# Patient Record
Sex: MALE | Race: WHITE | Employment: FULL TIME | URBAN - METROPOLITAN AREA
[De-identification: names, ages, dates, MRNs, and addresses within clinical notes are randomized per-mention and may not be internally consistent; named-entity substitution may affect disease eponyms.]

---

## 2017-10-09 ENCOUNTER — HOSPITAL ENCOUNTER (OUTPATIENT)
Dept: CT IMAGING | Age: 52
Discharge: HOME | End: 2017-10-09
Attending: INTERNAL MEDICINE | Admitting: INTERNAL MEDICINE

## 2017-10-09 DIAGNOSIS — R10.9 ACUTE ABDOMINAL PAIN: ICD-10-CM

## 2022-01-17 ENCOUNTER — ANESTHESIA EVENT (OUTPATIENT)
Dept: ENDOSCOPY | Age: 57
End: 2022-01-17
Payer: COMMERCIAL

## 2022-01-17 ENCOUNTER — ANESTHESIA (OUTPATIENT)
Dept: ENDOSCOPY | Age: 57
End: 2022-01-17
Payer: COMMERCIAL

## 2022-01-17 ENCOUNTER — HOSPITAL ENCOUNTER (OUTPATIENT)
Age: 57
Setting detail: OUTPATIENT SURGERY
Discharge: HOME | End: 2022-01-17
Attending: INTERNAL MEDICINE | Admitting: INTERNAL MEDICINE
Payer: COMMERCIAL

## 2022-01-17 VITALS — SYSTOLIC BLOOD PRESSURE: 129 MMHG | OXYGEN SATURATION: 96 % | DIASTOLIC BLOOD PRESSURE: 66 MMHG

## 2022-01-17 VITALS
OXYGEN SATURATION: 98 % | TEMPERATURE: 97.8 F | SYSTOLIC BLOOD PRESSURE: 131 MMHG | HEIGHT: 73 IN | RESPIRATION RATE: 20 BRPM | WEIGHT: 315 LBS | DIASTOLIC BLOOD PRESSURE: 80 MMHG | BODY MASS INDEX: 41.75 KG/M2 | HEART RATE: 67 BPM

## 2022-01-17 PROBLEM — R19.5 POSITIVE COLORECTAL CANCER SCREENING USING COLOGUARD TEST: Status: ACTIVE | Noted: 2022-01-17

## 2022-01-17 LAB
GLUCOSE BLD-MCNC: 112 MG/DL (ref 70–99)
GLUCOSE BLD-MCNC: 125 MG/DL (ref 70–99)
PERFORMED ON: ABNORMAL
PERFORMED ON: ABNORMAL
SARS-COV-2, NAAT: NOT DETECTED

## 2022-01-17 PROCEDURE — 7100000010 HC PHASE II RECOVERY - FIRST 15 MIN: Performed by: INTERNAL MEDICINE

## 2022-01-17 PROCEDURE — 7100000001 HC PACU RECOVERY - ADDTL 15 MIN: Performed by: INTERNAL MEDICINE

## 2022-01-17 PROCEDURE — 7100000011 HC PHASE II RECOVERY - ADDTL 15 MIN: Performed by: INTERNAL MEDICINE

## 2022-01-17 PROCEDURE — 87635 SARS-COV-2 COVID-19 AMP PRB: CPT

## 2022-01-17 PROCEDURE — 3700000000 HC ANESTHESIA ATTENDED CARE: Performed by: INTERNAL MEDICINE

## 2022-01-17 PROCEDURE — 6360000002 HC RX W HCPCS: Performed by: NURSE ANESTHETIST, CERTIFIED REGISTERED

## 2022-01-17 PROCEDURE — 3700000001 HC ADD 15 MINUTES (ANESTHESIA): Performed by: INTERNAL MEDICINE

## 2022-01-17 PROCEDURE — 7100000000 HC PACU RECOVERY - FIRST 15 MIN: Performed by: INTERNAL MEDICINE

## 2022-01-17 PROCEDURE — 2580000003 HC RX 258: Performed by: NURSE ANESTHETIST, CERTIFIED REGISTERED

## 2022-01-17 PROCEDURE — 2709999900 HC NON-CHARGEABLE SUPPLY: Performed by: INTERNAL MEDICINE

## 2022-01-17 PROCEDURE — 3609010600 HC COLONOSCOPY POLYPECTOMY SNARE/COLD BIOPSY: Performed by: INTERNAL MEDICINE

## 2022-01-17 PROCEDURE — 88305 TISSUE EXAM BY PATHOLOGIST: CPT

## 2022-01-17 PROCEDURE — 2500000003 HC RX 250 WO HCPCS: Performed by: NURSE ANESTHETIST, CERTIFIED REGISTERED

## 2022-01-17 PROCEDURE — 2580000003 HC RX 258: Performed by: ANESTHESIOLOGY

## 2022-01-17 RX ORDER — ATORVASTATIN CALCIUM 40 MG/1
40 TABLET, FILM COATED ORAL DAILY
COMMUNITY

## 2022-01-17 RX ORDER — PROPOFOL 10 MG/ML
INJECTION, EMULSION INTRAVENOUS PRN
Status: DISCONTINUED | OUTPATIENT
Start: 2022-01-17 | End: 2022-01-17 | Stop reason: SDUPTHER

## 2022-01-17 RX ORDER — INSULIN ASPART 100 [IU]/ML
18 INJECTION, SOLUTION INTRAVENOUS; SUBCUTANEOUS 2 TIMES DAILY
COMMUNITY

## 2022-01-17 RX ORDER — LANSOPRAZOLE 30 MG/1
30 CAPSULE, DELAYED RELEASE ORAL DAILY
COMMUNITY

## 2022-01-17 RX ORDER — INSULIN GLARGINE 100 [IU]/ML
130 INJECTION, SOLUTION SUBCUTANEOUS NIGHTLY
COMMUNITY

## 2022-01-17 RX ORDER — SODIUM CHLORIDE 9 MG/ML
INJECTION, SOLUTION INTRAVENOUS CONTINUOUS
Status: DISCONTINUED | OUTPATIENT
Start: 2022-01-17 | End: 2022-01-17 | Stop reason: HOSPADM

## 2022-01-17 RX ORDER — SODIUM CHLORIDE 9 MG/ML
INJECTION, SOLUTION INTRAVENOUS CONTINUOUS PRN
Status: DISCONTINUED | OUTPATIENT
Start: 2022-01-17 | End: 2022-01-17 | Stop reason: SDUPTHER

## 2022-01-17 RX ORDER — SEMAGLUTIDE 1.34 MG/ML
INJECTION, SOLUTION SUBCUTANEOUS WEEKLY
COMMUNITY

## 2022-01-17 RX ORDER — LIDOCAINE HYDROCHLORIDE 20 MG/ML
INJECTION, SOLUTION INFILTRATION; PERINEURAL PRN
Status: DISCONTINUED | OUTPATIENT
Start: 2022-01-17 | End: 2022-01-17 | Stop reason: SDUPTHER

## 2022-01-17 RX ADMIN — PROPOFOL 100 MG: 10 INJECTION, EMULSION INTRAVENOUS at 11:19

## 2022-01-17 RX ADMIN — PROPOFOL 100 MG: 10 INJECTION, EMULSION INTRAVENOUS at 10:53

## 2022-01-17 RX ADMIN — PROPOFOL 100 MG: 10 INJECTION, EMULSION INTRAVENOUS at 11:14

## 2022-01-17 RX ADMIN — PROPOFOL 100 MG: 10 INJECTION, EMULSION INTRAVENOUS at 10:56

## 2022-01-17 RX ADMIN — PROPOFOL 100 MG: 10 INJECTION, EMULSION INTRAVENOUS at 11:00

## 2022-01-17 RX ADMIN — PROPOFOL 100 MG: 10 INJECTION, EMULSION INTRAVENOUS at 11:23

## 2022-01-17 RX ADMIN — SODIUM CHLORIDE: 9 INJECTION, SOLUTION INTRAVENOUS at 10:24

## 2022-01-17 RX ADMIN — PROPOFOL 100 MG: 10 INJECTION, EMULSION INTRAVENOUS at 11:10

## 2022-01-17 RX ADMIN — SODIUM CHLORIDE: 9 INJECTION, SOLUTION INTRAVENOUS at 10:42

## 2022-01-17 RX ADMIN — PROPOFOL 100 MG: 10 INJECTION, EMULSION INTRAVENOUS at 10:49

## 2022-01-17 RX ADMIN — PROPOFOL 100 MG: 10 INJECTION, EMULSION INTRAVENOUS at 11:06

## 2022-01-17 RX ADMIN — PROPOFOL 100 MG: 10 INJECTION, EMULSION INTRAVENOUS at 10:46

## 2022-01-17 RX ADMIN — LIDOCAINE HYDROCHLORIDE 100 MG: 20 INJECTION, SOLUTION INFILTRATION; PERINEURAL at 10:46

## 2022-01-17 ASSESSMENT — PULMONARY FUNCTION TESTS
PIF_VALUE: 1
PIF_VALUE: 0
PIF_VALUE: 1
PIF_VALUE: 0
PIF_VALUE: 1
PIF_VALUE: 0
PIF_VALUE: 1

## 2022-01-17 ASSESSMENT — PAIN - FUNCTIONAL ASSESSMENT: PAIN_FUNCTIONAL_ASSESSMENT: 0-10

## 2022-01-17 ASSESSMENT — ENCOUNTER SYMPTOMS: SHORTNESS OF BREATH: 0

## 2022-01-17 NOTE — ANESTHESIA POSTPROCEDURE EVALUATION
Department of Anesthesiology  Postprocedure Note    Patient: Keira Swartz  MRN: [id number]  YOB: 1965  Date of evaluation: 1/17/2022  Time:  12:11 PM     Procedure Summary     Date: 01/17/22 Room / Location: [address] [place] [address]    Anesthesia Start: 8560 Anesthesia Stop: 7253    Procedure: COLONOSCOPY POLYPECTOMY SNARE/COLD BIOPSY (N/A ) Diagnosis: (Screening Colon)    Surgeons: Renay Mott MD Responsible Provider: Luke Bates MD    Anesthesia Type: MAC ASA Status: 3          Anesthesia Type: MAC    Brayan Phase I: Brayan Score: 10    Brayan Phase II:      Last vitals: Reviewed and per EMR flowsheets. Anesthesia Post Evaluation    Patient location during evaluation: PACU  Level of consciousness: awake  Airway patency: patent  Complications: no  Cardiovascular status: hemodynamically stable  Respiratory status: acceptable  There was medical reason for not using a multimodal analgesia pain management approach.

## 2022-01-17 NOTE — H&P
Gastroenterology Note             Pre-operative History and Physical    Patient: Silvana Gosselin  : 1965  CSN: [id number]    History Obtained From:  patient and/or guardian. HISTORY OF PRESENT ILLNESS:    The patient is a 61 y.o. male  here for po cologard. Past Medical History:    Past Medical History:   Diagnosis Date    Diabetes mellitus ([address]     Herpes labialis     Low testosterone     Lumbar radiculopathy 2013    Obesity     Obesity     Osteoarthritis, knee     Vitamin D deficiency      Past Surgical History:    No past surgical history on file. Medications Prior to Admission:   No current facility-administered medications on file prior to encounter. Current Outpatient Medications on File Prior to Encounter   Medication Sig Dispense Refill    sitaGLIPtin (JANUVIA) 100 MG tablet Take 1 tablet by mouth daily 30 tablet 2    metFORMIN (GLUCOPHAGE) 500 MG tablet Take 1 tablet by mouth 2 times daily (with meals) 120 tablet 0    lisinopril (PRINIVIL;ZESTRIL) 2.5 MG tablet Take 1 tablet by mouth daily 30 tablet 2    ASPIRIN LOW DOSE 81 MG EC tablet TAKE 1 TABLET BY MOUTH EVERY DAY 30 tablet 3    metFORMIN (GLUCOPHAGE) 1000 MG tablet Take 1 tablet by mouth 2 times daily (with meals) 60 tablet 3    methylPREDNISolone (MEDROL DOSEPACK) 4 MG tablet Take by mouth as directed. 21 tablet 0    albuterol (PROVENTIL HFA;VENTOLIN HFA) 108 (90 BASE) MCG/ACT inhaler Inhale 2 puffs into the lungs every 6 hours as needed for Wheezing 1 Inhaler 2    promethazine-codeine (PHENERGAN WITH CODEINE) 6.25-10 MG/5ML syrup Take 5 mLs by mouth 4 times daily as needed for Cough. 240 mL 0    glucose blood VI test strips (FREESTYLE TEST STRIPS) strip Testing qid dx:250.00 freestyle lite test strips 100 each 3        Allergies:  Patient has no known allergies.       Social History:   Social History     Tobacco Use    Smoking status: Never Smoker    Smokeless tobacco: Never Used   Substance Use Topics    Alcohol use: Yes     Comment: rarely     Family History:   No family history on file. PHYSICAL EXAM:      There were no vitals taken for this visit. I        Heart:   within normal limits    Lungs:  CTA bilat anteriorly,  Normal effort    Abdomen:   soft, NT, ND      ASA Grade:  ASA 2 - Patient with mild systemic disease with no functional limitations    Mallampati Class: 2      ASSESSMENT AND PLAN:    1. Patient is a 61 y.o. male here for EGD/Colonoscopy. 2.  Procedure options, risks and benefits reviewed with patient. Patient expresses understanding and wishes to proceed. Alexandria Ruiz MD,   [facility]  1/17/2022    Please note that some or all of this record was generated using voice recognition software. If there are any questions about the content of this document, please contact the author as some errors in translation may have occurred.

## 2022-01-17 NOTE — ANESTHESIA PRE PROCEDURE
Department of Anesthesiology  Preprocedure Note       Name:  Avelina Flores   Age:  61 y.o.  :  1965                                          MRN:  [id number]         Date:  2022      Surgeon: Rasheed Stevens):  Jaqueline Pina MD    Procedure: Procedure(s):  COLONOSCOPY DIAGNOSTIC    Medications prior to admission:   Prior to Admission medications    Medication Sig Start Date End Date Taking? Authorizing Provider   Semaglutide,0.25 or 0.5MG/DOS, (OZEMPIC, 0.25 OR 0.5 MG/DOSE,) 2 MG/1.5ML SOPN Inject into the skin once a week On thurs   Yes Historical Provider, MD   insulin glargine (BASAGLAR KWIKPEN) 100 UNIT/ML injection pen Inject 130 Units into the skin nightly   Yes Historical Provider, MD   insulin aspart (NOVOLOG FLEXPEN) 100 UNIT/ML injection pen Inject 18 Units into the skin 2 times daily   Yes Historical Provider, MD   atorvastatin (LIPITOR) 40 MG tablet Take 40 mg by mouth daily   Yes Historical Provider, MD   lansoprazole (PREVACID) 30 MG delayed release capsule Take 30 mg by mouth daily   Yes Historical Provider, MD   lisinopril (PRINIVIL;ZESTRIL) 2.5 MG tablet Take 1 tablet by mouth daily  Patient taking differently: Take 10 mg by mouth daily  10/26/15  Yes Joellen Sebastian MD   ASPIRIN LOW DOSE 81 MG EC tablet TAKE 1 TABLET BY MOUTH EVERY DAY 6/10/15  Yes Hien Haas MD   metFORMIN (GLUCOPHAGE) 1000 MG tablet Take 1 tablet by mouth 2 times daily (with meals) 6/10/15  Yes Hien Haas MD   sitaGLIPtin (JANUVIA) 100 MG tablet Take 1 tablet by mouth daily 10/26/15   Joellen Sebastian MD   metFORMIN (GLUCOPHAGE) 500 MG tablet Take 1 tablet by mouth 2 times daily (with meals) 10/26/15   Joellen Sebastian MD   methylPREDNISolone (MEDROL DOSEPACK) 4 MG tablet Take by mouth as directed.  4/27/15   Joellen Sebastian MD   albuterol (PROVENTIL HFA;VENTOLIN HFA) 108 (90 BASE) MCG/ACT inhaler Inhale 2 puffs into the lungs every 6 hours as needed for Wheezing 4/27/15   Joellen Sebastian MD   promethazine-codeine (PHENERGAN WITH CODEINE) 6.25-10 MG/5ML syrup Take 5 mLs by mouth 4 times daily as needed for Cough. 2/23/15   DELAI Mosley - CNP   glucose blood VI test strips (FREESTYLE TEST STRIPS) strip Testing qid dx:250.00 freestyle lite test strips 1/15/15   Rony Lepe MD       Current medications:    Current Facility-Administered Medications   Medication Dose Route Frequency Provider Last Rate Last Admin    0.9 % sodium chloride infusion   IntraVENous Continuous Chuy Ramirez MD           Allergies:  No Known Allergies    Problem List:    Patient Active Problem List   Diagnosis Code    Osteoarthritis, knee M17.10    Low testosterone R79.89    Herpes labialis B00.1    Obesity E66.9    Lumbar radiculopathy M54.16    Elevated blood pressure reading R03.0    Impaired fasting glucose R73.01    Diabetes mellitus ([address] E11.9    Vitamin D deficiency E55.9    Diabetic peripheral neuropathy (HCC) E11.42    Positive colorectal cancer screening using Cologuard test R19.5       Past Medical History:        Diagnosis Date    Diabetes mellitus ([address]     Herpes labialis     Low testosterone     Lumbar radiculopathy 12/27/2013    Obesity     Obesity     Osteoarthritis, knee     Vitamin D deficiency        Past Surgical History:  History reviewed. No pertinent surgical history. Social History:    Social History     Tobacco Use    Smoking status: Never Smoker    Smokeless tobacco: Never Used   Substance Use Topics    Alcohol use: Yes     Comment: rarely                                Counseling given: Not Answered      Vital Signs (Current): There were no vitals filed for this visit.                                            BP Readings from Last 3 Encounters:   02/23/15 (!) 146/92   01/06/15 (!) 150/98   10/09/14 120/80       NPO Status: Time of last liquid consumption: 2300                        Time of last solid consumption: 2300                        Date of last liquid consumption: 01/16/22                        Date of last solid food consumption: 01/15/22    BMI:   Wt Readings from Last 3 Encounters:   04/27/15 (!) 367 lb (166.5 kg)   02/23/15 (!) 363 lb (164.7 kg)   01/06/15 (!) 366 lb (166 kg)     There is no height or weight on file to calculate BMI.    CBC:   Lab Results   Component Value Date    WBC 10.6 04/27/2015    RBC 4.85 04/27/2015    HGB 15.1 04/27/2015    HCT 46.0 04/27/2015    MCV 94.9 04/27/2015    RDW 13.5 04/27/2015     04/27/2015       CMP:   Lab Results   Component Value Date     04/27/2015    K 4.7 04/27/2015    CL 99 04/27/2015    CO2 27 04/27/2015    BUN 14 04/27/2015    CREATININE 0.6 04/27/2015    GFRAA >60 04/27/2015    GFRAA >60 04/08/2013    AGRATIO 1.7 04/27/2015    LABGLOM >60 04/27/2015    GLUCOSE 210 04/27/2015    PROT 6.8 04/27/2015    PROT 7.0 05/03/2012    CALCIUM 9.5 04/27/2015    BILITOT 0.3 04/27/2015    ALKPHOS 76 04/27/2015    AST 44 04/27/2015    ALT 75 04/27/2015       POC Tests: No results for input(s): POCGLU, POCNA, POCK, POCCL, POCBUN, POCHEMO, POCHCT in the last 72 hours.     Coags: No results found for: PROTIME, INR, APTT    HCG (If Applicable): No results found for: PREGTESTUR, PREGSERUM, HCG, HCGQUANT     ABGs: No results found for: PHART, PO2ART, ZYC6YZT, IMD2YXK, BEART, I4SFJCKQ     Type & Screen (If Applicable):  No results found for: LABABO, LABRH    Drug/Infectious Status (If Applicable):  No results found for: HIV, HEPCAB    COVID-19 Screening (If Applicable): No results found for: COVID19        Anesthesia Evaluation  Patient summary reviewed and Nursing notes reviewed no history of anesthetic complications:   Airway: Mallampati: II  TM distance: >3 FB   Neck ROM: full  Mouth opening: > = 3 FB Dental: normal exam         Pulmonary:   (+) sleep apnea: on CPAP,      (-) asthma and shortness of breath                           Cardiovascular:        (-) hypertension and  angina                Neuro/Psych:      (-) CVA           GI/Hepatic/Renal:   (+) GERD:, morbid obesity     (-) liver disease       Endo/Other:    (+) DiabetesType II DM, , : arthritis: OA., .    (-) hypothyroidism               Abdominal:             Vascular:     - PVD. Other Findings:           Anesthesia Plan      MAC     ASA 3       Induction: intravenous. Anesthetic plan and risks discussed with patient. Plan discussed with CRNA.                   Rachid Elliott MD   1/17/2022

## 2022-01-17 NOTE — PROGRESS NOTES
Patient's awake and alert. On room air NSR on the monitor. VSS. Abdomen soft. Denies pain. Patient meets criteria to be discharged from phase 1.  Will prepare for discharge home in phase 2    Electronically signed by Harley Montes De Oca RN on 1/17/2022 at [phone]

## 2022-01-17 NOTE — OP NOTE
Colonoscopy Procedure  Note          Patient: Isabel Huddleston  : 1965  CRN:  @[email or web address]@    Procedure: Colonoscopy with polypectomy (cold snare)    Date:  2022    Surgeon: Brea Jackson MD, MD    Referring Physician:  Salo Bowman MD    Preoperative Diagnosis:  Screening Colon    Postoperative Diagnosis:  * No post-op diagnosis entered *    Anesthesia:  MAC    EBL: Minimal to none. Indications: This is a 61y.o. year old male who presents today with positive Cologuard test.     Procedure: An informed consent was obtained from the patient after explanation of indications, benefits, possible risks and complications of the procedure. The patient was then taken to the endoscopy suite, placed in the left lateral decubitus position, and the above IV anesthesia was administered. Digital rectal examination was performed. With the patient in the left lateral decubitus position the endoscope was inserted through the anorectal area into the rectum. The scope was then advanced through the length of the colon to the terminal ileum. The quality of preparation was adequate. The scope was carefully withdrawn and the mucosa was fully inspected including retroflexion in the rectum. Findings and interventions are described below. The patient tolerated the procedure well and was taken to the PACU in good condition. There were no immediate complications. Impression: #1 (2) 3 to 4 mm transverse colon sessile polyps removed using cold snare polypectomy. 2. Minimal scattered diverticulosis. Recommendations: Follow-up on biopsy results. Repeat colonoscopy 5 years. Brea Jackson MD, MD   [facility]  2022      Please note that some or all of this record was generated using voice recognition software. If there are any questions about the content of this document, please contact the author as some errors in translation may have occurred.

## 2022-01-17 NOTE — PROGRESS NOTES
All personal belongings with patient.  Wife taking patient home in stable condition    Electronically signed by Linda Hinojosa RN on 1/17/2022 at 21 [phone]

## 2022-01-17 NOTE — PROGRESS NOTES
Patient arrived from endo to pacu. Arouses to voice. On 8 L simple mask. NSR on the monitor. VSS. Abdomen soft.     Electronically signed by Casey Rudolph RN on 1/17/2022 at [id number]

## 2022-01-17 NOTE — PROGRESS NOTES
Dr. Kenna Altman at bedside talking to patient    Electronically signed by Emilia Mcintyre RN on 1/17/2022 at 11:55 AM

## 2022-01-17 NOTE — PROGRESS NOTES
Discharge instructions went over with patient and patient's wife, Phillip Yepez    Electronically signed by Ghulam Cohen RN on 1/17/2022 at 12:03 PM

## 2022-07-28 ENCOUNTER — HOSPITAL ENCOUNTER (OUTPATIENT)
Dept: NON INVASIVE DIAGNOSTICS | Age: 57
Discharge: HOME | End: 2022-07-28
Payer: COMMERCIAL

## 2022-07-28 DIAGNOSIS — R07.9 CHEST PAIN, UNSPECIFIED TYPE: ICD-10-CM

## 2022-07-28 LAB
LV EF: 64 %
LVEF MODALITY: NORMAL

## 2022-07-28 PROCEDURE — 93017 CV STRESS TEST TRACING ONLY: CPT | Performed by: INTERNAL MEDICINE

## 2022-07-28 PROCEDURE — 3430000000 HC RX DIAGNOSTIC RADIOPHARMACEUTICAL: Performed by: INTERNAL MEDICINE

## 2022-07-28 PROCEDURE — 78452 HT MUSCLE IMAGE SPECT MULT: CPT

## 2022-07-28 PROCEDURE — A9502 TC99M TETROFOSMIN: HCPCS | Performed by: INTERNAL MEDICINE

## 2022-07-28 RX ADMIN — TETROFOSMIN 30 MILLICURIE: 1.38 INJECTION, POWDER, LYOPHILIZED, FOR SOLUTION INTRAVENOUS at 09:27

## 2022-07-28 RX ADMIN — TETROFOSMIN 10 MILLICURIE: 1.38 INJECTION, POWDER, LYOPHILIZED, FOR SOLUTION INTRAVENOUS at 07:59

## 2022-07-28 NOTE — PROGRESS NOTES
Patient instructed on Carlos Protocol Stress Test Procedure including possible side effects and adverse reactions. Verbalizes knowledge and understanding and denies having any questions.

## (undated) DEVICE — SNARE ENDOSCP L240CM SHTH DIA24MM LOOP W10MM POLYP RND REINF

## (undated) DEVICE — BW-412T DISP COMBO CLEANING BRUSH: Brand: SINGLE USE COMBINATION CLEANING BRUSH

## (undated) DEVICE — PROCEDURE KIT ENDOSCP CUST

## (undated) DEVICE — SOLUTION IV IRRIG WATER 500ML POUR BRL ST 2F7113

## (undated) DEVICE — SET VLV 3 PC AWS DISPOSABLE GRDIAN SCOPEVALET

## (undated) DEVICE — GLOVE ORTHO 8   MSG9480

## (undated) DEVICE — TRAP SPEC RETRV CLR PLAS POLYP IN LN SUCT QUIK CTCH